# Patient Record
Sex: FEMALE | Race: WHITE | HISPANIC OR LATINO | URBAN - METROPOLITAN AREA
[De-identification: names, ages, dates, MRNs, and addresses within clinical notes are randomized per-mention and may not be internally consistent; named-entity substitution may affect disease eponyms.]

---

## 2020-07-25 ENCOUNTER — TELEPHONE ENCOUNTER (OUTPATIENT)
Dept: URBAN - METROPOLITAN AREA CLINIC 13 | Facility: CLINIC | Age: 20
End: 2020-07-25

## 2020-07-26 ENCOUNTER — TELEPHONE ENCOUNTER (OUTPATIENT)
Dept: URBAN - METROPOLITAN AREA CLINIC 13 | Facility: CLINIC | Age: 20
End: 2020-07-26

## 2020-07-26 RX ORDER — PREDNISONE 20 MG/1
TAKE 1 TABLET TWICE DAILY TABLET ORAL
Refills: 0 | Status: ACTIVE | COMMUNITY

## 2023-04-25 ENCOUNTER — TELEPHONE ENCOUNTER (OUTPATIENT)
Dept: URBAN - METROPOLITAN AREA CLINIC 113 | Facility: CLINIC | Age: 23
End: 2023-04-25

## 2023-07-10 ENCOUNTER — OFFICE VISIT (OUTPATIENT)
Dept: URBAN - METROPOLITAN AREA CLINIC 107 | Facility: CLINIC | Age: 23
End: 2023-07-10
Payer: COMMERCIAL

## 2023-07-10 ENCOUNTER — WEB ENCOUNTER (OUTPATIENT)
Dept: URBAN - METROPOLITAN AREA CLINIC 107 | Facility: CLINIC | Age: 23
End: 2023-07-10

## 2023-07-10 VITALS
TEMPERATURE: 96.4 F | HEIGHT: 66 IN | HEART RATE: 77 BPM | WEIGHT: 234 LBS | DIASTOLIC BLOOD PRESSURE: 83 MMHG | RESPIRATION RATE: 18 BRPM | SYSTOLIC BLOOD PRESSURE: 116 MMHG | BODY MASS INDEX: 37.61 KG/M2

## 2023-07-10 DIAGNOSIS — K21.9 GASTROESOPHAGEAL REFLUX DISEASE, UNSPECIFIED WHETHER ESOPHAGITIS PRESENT: ICD-10-CM

## 2023-07-10 DIAGNOSIS — R11.0 NAUSEA: ICD-10-CM

## 2023-07-10 DIAGNOSIS — K21.9 ACID REFLUX: ICD-10-CM

## 2023-07-10 PROBLEM — 235595009: Status: ACTIVE | Noted: 2023-07-10

## 2023-07-10 PROCEDURE — 99204 OFFICE O/P NEW MOD 45 MIN: CPT | Performed by: INTERNAL MEDICINE

## 2023-07-10 PROCEDURE — 99244 OFF/OP CNSLTJ NEW/EST MOD 40: CPT | Performed by: INTERNAL MEDICINE

## 2023-07-10 RX ORDER — PANTOPRAZOLE SODIUM 40 MG/1
1 TABLET TABLET, DELAYED RELEASE ORAL ONCE A DAY
Qty: 90 TABLET | Refills: 2 | OUTPATIENT
Start: 2023-07-10

## 2023-07-10 RX ORDER — PREDNISONE 20 MG/1
TAKE 1 TABLET TWICE DAILY TABLET ORAL
Refills: 0 | Status: ON HOLD | COMMUNITY

## 2023-07-10 NOTE — HPI-TODAY'S VISIT:
22-year-old woman referred by Dr. Darrius Howard for evaluation of GERD.  A copy of today's visit will be forwarded to the referring provider. Referral notes are reviewed.  H. pylori breath test 2/11/2023 was negative, though she continued with reflux type symptoms.  She reported improvement with use of Tums as needed.  She also complained of constipation for which she was recommended to begin MiraLAX. She was last seen in our office in July 2018 following an acute illness for which she was suspected to have a viral etiology.  Labs in the emergency department demonstrated elevated liver function tests.  She has a long standing history of indigestion, manifested as regurgitation, which seems to be worsening. She has noted that her symptoms seem worse with stress, going to college and getting a job. She has nausea with meals. She has noted a decreased portion size. She cannot identify any food triggers. She initially thought milk or dairy products made it worse, but has times when she tolerates dairy without issue. She can have stomach cramping at times. She increased belching. She gets relief with belching, passing gas or having a bowel movement. There is no vomiting. Her bowels are moving better with use of Miralax. She has noted improvement of her abdominal symptoms with following a  diet. She endorses anxiety around meals.  She has a paternal great aunt with ulcerative colitis and maternal great grandfather with stomach ulcers.  She also describes a history of exercise induced asthma.

## 2023-10-17 ENCOUNTER — OFFICE VISIT (OUTPATIENT)
Dept: URBAN - METROPOLITAN AREA CLINIC 107 | Facility: CLINIC | Age: 23
End: 2023-10-17
Payer: COMMERCIAL

## 2023-10-17 ENCOUNTER — DASHBOARD ENCOUNTERS (OUTPATIENT)
Age: 23
End: 2023-10-17

## 2023-10-17 VITALS
SYSTOLIC BLOOD PRESSURE: 122 MMHG | HEIGHT: 66 IN | WEIGHT: 233 LBS | TEMPERATURE: 97.9 F | BODY MASS INDEX: 37.45 KG/M2 | DIASTOLIC BLOOD PRESSURE: 80 MMHG | HEART RATE: 87 BPM | RESPIRATION RATE: 18 BRPM

## 2023-10-17 DIAGNOSIS — R11.0 NAUSEA: ICD-10-CM

## 2023-10-17 DIAGNOSIS — K21.9 GASTROESOPHAGEAL REFLUX DISEASE, UNSPECIFIED WHETHER ESOPHAGITIS PRESENT: ICD-10-CM

## 2023-10-17 PROCEDURE — 99213 OFFICE O/P EST LOW 20 MIN: CPT | Performed by: INTERNAL MEDICINE

## 2023-10-17 RX ORDER — PANTOPRAZOLE SODIUM 40 MG/1
1 TABLET TABLET, DELAYED RELEASE ORAL ONCE A DAY
Qty: 90 TABLET | Refills: 2 | Status: ACTIVE | COMMUNITY
Start: 2023-07-10

## 2023-10-17 NOTE — HPI-TODAY'S VISIT:
Patient returns today in follow-up.  She is doing better with her symptoms on daily Protonix.  She reports no vomiting but still some nausea.  Has episodes here and there.  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  Not having the same amount of burning.  She has questions about when she can come off the medication.  No blood in the stool or other alarm symptoms.  No swallowing difficulty.